# Patient Record
Sex: FEMALE | Race: WHITE | NOT HISPANIC OR LATINO | Employment: FULL TIME | URBAN - METROPOLITAN AREA
[De-identification: names, ages, dates, MRNs, and addresses within clinical notes are randomized per-mention and may not be internally consistent; named-entity substitution may affect disease eponyms.]

---

## 2020-04-10 LAB
BILIRUB SERPL-MCNC: NEGATIVE MG/DL
BLOOD URINE, POC: NORMAL
CLARITY, POC UA: NORMAL
COLOR, POC UA: YELLOW
GLUCOSE UR QL STRIP: NEGATIVE
KETONES UR QL STRIP: NEGATIVE
LEUKOCYTE EST, POC UA: NORMAL
NITRITE, POC UA: POSITIVE
PH, POC UA: 7.5
PROTEIN, POC: NEGATIVE
SPECIFIC GRAVITY, POC UA: 1.02
UROBILINOGEN, POC UA: NORMAL

## 2022-01-16 ENCOUNTER — HISTORICAL (OUTPATIENT)
Dept: LAB | Facility: HOSPITAL | Age: 22
End: 2022-01-16

## 2022-01-22 ENCOUNTER — HISTORICAL (OUTPATIENT)
Dept: ADMINISTRATIVE | Facility: HOSPITAL | Age: 22
End: 2022-01-22

## 2022-01-22 LAB
FLUAV AG UPPER RESP QL IA.RAPID: NEGATIVE
FLUBV AG UPPER RESP QL IA.RAPID: NEGATIVE
SARS-COV-2 RNA RESP QL NAA+PROBE: NEGATIVE

## 2022-04-10 ENCOUNTER — HISTORICAL (OUTPATIENT)
Dept: ADMINISTRATIVE | Facility: HOSPITAL | Age: 22
End: 2022-04-10
Payer: COMMERCIAL

## 2022-04-11 ENCOUNTER — HISTORICAL (OUTPATIENT)
Dept: ADMINISTRATIVE | Facility: HOSPITAL | Age: 22
End: 2022-04-11
Payer: COMMERCIAL

## 2022-04-28 VITALS
SYSTOLIC BLOOD PRESSURE: 134 MMHG | OXYGEN SATURATION: 97 % | DIASTOLIC BLOOD PRESSURE: 92 MMHG | WEIGHT: 249.13 LBS | HEIGHT: 67 IN | BODY MASS INDEX: 39.1 KG/M2

## 2022-04-28 VITALS
BODY MASS INDEX: 39.1 KG/M2 | OXYGEN SATURATION: 97 % | HEIGHT: 67 IN | DIASTOLIC BLOOD PRESSURE: 92 MMHG | SYSTOLIC BLOOD PRESSURE: 134 MMHG | WEIGHT: 249.13 LBS

## 2022-05-27 ENCOUNTER — OCCUPATIONAL HEALTH (OUTPATIENT)
Dept: URGENT CARE | Facility: CLINIC | Age: 22
End: 2022-05-27
Payer: COMMERCIAL

## 2022-05-27 DIAGNOSIS — Z11.52 ENCOUNTER FOR SCREENING FOR COVID-19: Primary | ICD-10-CM

## 2022-05-27 LAB
CTP QC/QA: YES
SARS-COV-2 RDRP RESP QL NAA+PROBE: POSITIVE

## 2022-05-27 NOTE — PROGRESS NOTES
Subjective:       Patient ID: Vivian Mcginnis is a 21 y.o. female.    Vitals:  vitals were not taken for this visit.     Chief Complaint: No chief complaint on file.    Employee covid testing, results given to employee    ROS    Objective:      Physical Exam      Assessment:       1. Encounter for screening for COVID-19          Plan:         Encounter for screening for COVID-19  -     POCT COVID-19 Rapid Screening

## 2022-09-19 ENCOUNTER — LAB VISIT (OUTPATIENT)
Dept: LAB | Facility: HOSPITAL | Age: 22
End: 2022-09-19
Attending: OBSTETRICS & GYNECOLOGY
Payer: COMMERCIAL

## 2022-09-19 DIAGNOSIS — E28.2 POLYCYSTIC OVARIES: Primary | ICD-10-CM

## 2022-09-19 DIAGNOSIS — E66.8 OBESITY OF ENDOCRINE ORIGIN: ICD-10-CM

## 2022-09-19 LAB
ALBUMIN SERPL-MCNC: 4.6 GM/DL (ref 3.5–5)
ALBUMIN/GLOB SERPL: 2 RATIO (ref 1.1–2)
ALP SERPL-CCNC: 55 UNIT/L (ref 40–150)
ALT SERPL-CCNC: 27 UNIT/L (ref 0–55)
AST SERPL-CCNC: 20 UNIT/L (ref 5–34)
BILIRUBIN DIRECT+TOT PNL SERPL-MCNC: 0.5 MG/DL
BUN SERPL-MCNC: 7.2 MG/DL (ref 7–18.7)
CALCIUM SERPL-MCNC: 9.7 MG/DL (ref 8.4–10.2)
CHLORIDE SERPL-SCNC: 106 MMOL/L (ref 98–107)
CO2 SERPL-SCNC: 27 MMOL/L (ref 22–29)
CORTIS SERPL-SCNC: 5.6 UG/DL
CREAT SERPL-MCNC: 0.62 MG/DL (ref 0.55–1.02)
ESTRADIOL SERPL HS-MCNC: 26 PG/ML
FSH SERPL-ACNC: 4.37 MIU/ML
GFR SERPLBLD CREATININE-BSD FMLA CKD-EPI: >60 MLS/MIN/1.73/M2
GLOBULIN SER-MCNC: 2.3 GM/DL (ref 2.4–3.5)
GLUCOSE SERPL-MCNC: 84 MG/DL (ref 74–100)
LH SERPL-ACNC: 1.75 MIU/ML
POTASSIUM SERPL-SCNC: 4.3 MMOL/L (ref 3.5–5.1)
PROLACTIN LEVEL (OHS): 12.02 NG/ML (ref 5.18–26.53)
PROT SERPL-MCNC: 6.9 GM/DL (ref 6.4–8.3)
SODIUM SERPL-SCNC: 140 MMOL/L (ref 136–145)
T4 FREE SERPL-MCNC: 0.84 NG/DL (ref 0.7–1.48)
TESTOST SERPL-MCNC: 40.61 NG/DL (ref 13.84–53.35)
TSH SERPL-ACNC: 1.35 UIU/ML (ref 0.35–4.94)

## 2022-09-19 PROCEDURE — 84146 ASSAY OF PROLACTIN: CPT

## 2022-09-19 PROCEDURE — 80053 COMPREHEN METABOLIC PANEL: CPT

## 2022-09-19 PROCEDURE — 84445 ASSAY OF TSI GLOBULIN: CPT

## 2022-09-19 PROCEDURE — 36415 COLL VENOUS BLD VENIPUNCTURE: CPT

## 2022-09-19 PROCEDURE — 82533 TOTAL CORTISOL: CPT

## 2022-09-19 PROCEDURE — 82670 ASSAY OF TOTAL ESTRADIOL: CPT

## 2022-09-19 PROCEDURE — 83002 ASSAY OF GONADOTROPIN (LH): CPT

## 2022-09-19 PROCEDURE — 84439 ASSAY OF FREE THYROXINE: CPT

## 2022-09-19 PROCEDURE — 84443 ASSAY THYROID STIM HORMONE: CPT

## 2022-09-19 PROCEDURE — 83520 IMMUNOASSAY QUANT NOS NONAB: CPT

## 2022-09-19 PROCEDURE — 83001 ASSAY OF GONADOTROPIN (FSH): CPT

## 2022-09-19 PROCEDURE — 84403 ASSAY OF TOTAL TESTOSTERONE: CPT

## 2022-09-20 LAB — MIS SERPL IA-MCNC: 6 NG/ML (ref 1.2–12)

## 2022-09-21 ENCOUNTER — HISTORICAL (OUTPATIENT)
Dept: ADMINISTRATIVE | Facility: HOSPITAL | Age: 22
End: 2022-09-21
Payer: COMMERCIAL

## 2022-09-23 LAB — TSI SER-ACNC: <0.1 IU/L

## 2025-02-07 ENCOUNTER — OFFICE VISIT (OUTPATIENT)
Dept: URGENT CARE | Facility: URGENT CARE | Age: 25
End: 2025-02-07
Payer: MEDICARE

## 2025-02-07 VITALS
DIASTOLIC BLOOD PRESSURE: 88 MMHG | WEIGHT: 247 LBS | SYSTOLIC BLOOD PRESSURE: 141 MMHG | HEART RATE: 91 BPM | RESPIRATION RATE: 18 BRPM | OXYGEN SATURATION: 95 % | TEMPERATURE: 98.4 F

## 2025-02-07 DIAGNOSIS — J02.9 SORE THROAT: ICD-10-CM

## 2025-02-07 DIAGNOSIS — J06.9 URI, ACUTE: Primary | ICD-10-CM

## 2025-02-07 LAB — S PYO DNA BLD POS QL NAA+NON-PROBE: NEGATIVE

## 2025-02-07 PROCEDURE — 87651 STREP A DNA AMP PROBE: CPT | Mod: PO | Performed by: PHYSICIAN ASSISTANT

## 2025-02-07 PROCEDURE — G0463 HOSPITAL OUTPT CLINIC VISIT: HCPCS | Mod: PO | Performed by: PHYSICIAN ASSISTANT

## 2025-02-07 PROCEDURE — 99203 OFFICE O/P NEW LOW 30 MIN: CPT | Performed by: PHYSICIAN ASSISTANT

## 2025-02-07 RX ORDER — DULOXETIN HYDROCHLORIDE 30 MG/1
30 CAPSULE, DELAYED RELEASE ORAL 2 TIMES DAILY
COMMUNITY
Start: 2025-01-16

## 2025-02-07 ASSESSMENT — ENCOUNTER SYMPTOMS
FATIGUE: 0
SHORTNESS OF BREATH: 0
CHEST TIGHTNESS: 0
HEADACHES: 0
SINUS PRESSURE: 0
CHILLS: 0
WHEEZING: 0
TROUBLE SWALLOWING: 0
MYALGIAS: 1
FEVER: 0
DIARRHEA: 0
WEAKNESS: 0
VOMITING: 0
COUGH: 1
RHINORRHEA: 1
NAUSEA: 0
ABDOMINAL PAIN: 0
DIZZINESS: 0
NECK STIFFNESS: 0
SINUS PAIN: 0
PALPITATIONS: 0
EYE DISCHARGE: 0
SORE THROAT: 1
ADENOPATHY: 0
NECK PAIN: 0

## 2025-02-07 NOTE — PATIENT INSTRUCTIONS
- Will call once strep results are available  - If strep is positive we will prescribe an antibiotic  - Prioritize Hydration with Water  - Symptomatic care, as your illness will resolve over the next few days, though you may have a lingering cough for the next 2-4 weeks  - Feel free to call with any questions or concerns      - Return or Go Directly to the ER if your symptoms worsen or if you develop Shortness of Breath

## 2025-02-07 NOTE — LETTER
[facility]  [address] [address]  [phone]  Dept: [phone]    February 7, 2025     Patient: Roselyn Lauren   YOB: 2000   Date of Visit: 2/7/2025       To Whom it May Concern:    Roselyn Lauren was seen in my clinic on 2/7/2025.  Please excuse her from work, as she recovers from a current illness.    If you have any questions or concerns, please don't hesitate to call.         Sincerely,          Elie Aquino III, PA-C        CC: No Recipients

## 2025-02-07 NOTE — PROGRESS NOTES
Subjective       Sore Throat   Associated symptoms include congestion, coughing and ear pain. Pertinent negatives include no abdominal pain, diarrhea, ear discharge, headaches, neck pain, shortness of breath, trouble swallowing or vomiting.       Roselyn Lauren is a 24 y.o. female who presents for and abrupt onset of URI symptoms, which began 3 days ago.  At present, she is endorsing bilateral ear pressure/pain, achiness, non productive cough, and sore throat. She denies any chest pain, dizziness, shortness of breath, wheezing, and sputum production, additionally, she denies any difficulty in breathing, ROMANO, CP, palpitations or swelling or tenderness of the lower extremities. She has taken NyQuil, acetaminophen and antihistamine, which provides temporary, if any, improvement in her symptoms. She states that she may have been exposed to someone who was sick at work last week.       The following have been reviewed and updated as appropriate in this visit:          No Known Allergies  Current Outpatient Medications   Medication Sig Dispense Refill    DULoxetine (CYMBALTA) 30 mg capsule Take 1 capsule (30 mg total) by mouth 2 times daily      levonorgestreL (MIRENA) 21 mcg/24hr (up to 8 yrs) 52 mg IUD 52 mg by intrauterine route      tirzepatide 2.5 mg/0.5 mL pen injector Inject 2.5 mg under the skin once a week (Patient not taking: Reported on 2/7/2025)       No current facility-administered medications for this visit.     No past medical history on file.  No past surgical history on file.  No family history on file.  Social History     Socioeconomic History    Marital status: Single     Social Drivers of Health     Tobacco Use: Low Risk  (7/9/2024)    [facility]    Patient History     Smoking Tobacco Use: Never     Smokeless Tobacco Use: Never     Passive Exposure: Never   Depression: Not at risk (7/9/2024)    Received from  [facility]    PHQ-2     PHQ-2 Score: 1       Review of Systems   Constitutional:  Negative for chills, fatigue and fever.   HENT:  Positive for congestion, ear pain, postnasal drip, rhinorrhea and sore throat. Negative for ear discharge, sinus pressure, sinus pain, sneezing and trouble swallowing.    Eyes:  Negative for discharge and visual disturbance.   Respiratory:  Positive for cough. Negative for chest tightness, shortness of breath and wheezing.    Cardiovascular:  Negative for chest pain, palpitations and leg swelling.   Gastrointestinal:  Negative for abdominal pain, diarrhea, nausea and vomiting.   Musculoskeletal:  Positive for myalgias. Negative for neck pain and neck stiffness.   Skin:  Negative for rash.   Neurological:  Negative for dizziness, weakness and headaches.   Hematological:  Negative for adenopathy.         Objective     Vitals:  /88 (BP Location: Left arm, Patient Position: Sitting, Cuff Size: Long Adult)   Pulse 91   Temp 36.9 °C (98.4 °F) (Temporal)   Resp 18   Wt 112 kg (247 lb)   SpO2 95%     Physical Exam:   Appearance: alert, well appearing, and in no distress, oriented to person, place, and time, acyanotic, in no respiratory distress, and well hydrated.   ENT: bilateral TM normal without fluid or infection, neck without nodes, pharynx erythematous without exudate, and sinuses nontender.   Chest: clear to auscultation, no wheezes, rales or rhonchi, symmetric air entry, no tachypnea, retractions or cyanosis.  MSK: No unilateral swelling, erythema or tenderness of the lower extremities.  Homans' sign negative bilaterally      Recent Results (from the past 24 hours)   Rapid Strep A PCR Swab    Collection Time: 02/07/25  8:09 AM   Result Value Ref Range    Streptococcus Pyogenes (GRP A) PCR Negative Negative         Assessment/Plan   Diagnoses and all orders for this visit:    URI, acute    Sore throat  -      Rapid Strep A PCR Swab          Discussion:  Testing for strep was negative today. Given that she had a negative flu and COVID test prior to coming in, I suspect that her symptoms are likely due to a viral cause, signifying a viral URI. I recommend continuing with supportive care, increasing water intake, ibuprofen and Tylenol, rest and cough suppressant of choice, as needed. Should symptoms worsen or fail to improve over the next 5 to 7 days, then she should should return to the urgent care or report to the ER. She was in agreement and verbalized an understanding of today's plan of care. She had no further questions prior to discharge.      Michael Buitrago III, PA-C